# Patient Record
Sex: FEMALE | Race: WHITE | Employment: FULL TIME | URBAN - METROPOLITAN AREA
[De-identification: names, ages, dates, MRNs, and addresses within clinical notes are randomized per-mention and may not be internally consistent; named-entity substitution may affect disease eponyms.]

---

## 2021-06-04 ENCOUNTER — APPOINTMENT (OUTPATIENT)
Dept: GENERAL RADIOLOGY | Facility: CLINIC | Age: 36
End: 2021-06-04
Attending: EMERGENCY MEDICINE
Payer: COMMERCIAL

## 2021-06-04 ENCOUNTER — HOSPITAL ENCOUNTER (EMERGENCY)
Facility: CLINIC | Age: 36
Discharge: HOME | End: 2021-06-05
Attending: EMERGENCY MEDICINE | Admitting: EMERGENCY MEDICINE
Payer: COMMERCIAL

## 2021-06-04 VITALS
SYSTOLIC BLOOD PRESSURE: 135 MMHG | WEIGHT: 293 LBS | BODY MASS INDEX: 51.91 KG/M2 | DIASTOLIC BLOOD PRESSURE: 102 MMHG | RESPIRATION RATE: 16 BRPM | OXYGEN SATURATION: 96 % | HEART RATE: 109 BPM | HEIGHT: 63 IN | TEMPERATURE: 97.7 F

## 2021-06-04 DIAGNOSIS — S62.337A CLOSED DISPLACED FRACTURE OF NECK OF FIFTH METACARPAL BONE OF LEFT HAND, INITIAL ENCOUNTER: ICD-10-CM

## 2021-06-04 DIAGNOSIS — S30.1XXA CONTUSION OF ABDOMINAL WALL, INITIAL ENCOUNTER: ICD-10-CM

## 2021-06-04 PROCEDURE — 26600 TREAT METACARPAL FRACTURE: CPT | Mod: F4

## 2021-06-04 PROCEDURE — 250N000013 HC RX MED GY IP 250 OP 250 PS 637: Performed by: EMERGENCY MEDICINE

## 2021-06-04 PROCEDURE — 73130 X-RAY EXAM OF HAND: CPT | Mod: LT

## 2021-06-04 PROCEDURE — 99284 EMERGENCY DEPT VISIT MOD MDM: CPT | Mod: 25

## 2021-06-04 RX ORDER — IBUPROFEN 600 MG/1
600 TABLET, FILM COATED ORAL ONCE
Status: COMPLETED | OUTPATIENT
Start: 2021-06-04 | End: 2021-06-04

## 2021-06-04 RX ADMIN — IBUPROFEN 600 MG: 600 TABLET, FILM COATED ORAL at 22:01

## 2021-06-04 ASSESSMENT — ENCOUNTER SYMPTOMS
HEADACHES: 0
VOMITING: 0
MYALGIAS: 1
NAUSEA: 0
ABDOMINAL PAIN: 1
NECK PAIN: 0
BACK PAIN: 0

## 2021-06-04 ASSESSMENT — MIFFLIN-ST. JEOR: SCORE: 2209.97

## 2021-06-05 NOTE — DISCHARGE INSTRUCTIONS
Please ice and elevate the left and the right hand.  We are suspicion of abdominal wall bruising.  We do not feel you need imaging of the abdomen.  You did have a small fracture of the left distal fifth metatarsal but this is involving the joint space between the phalanx and the fifth metacarpal.  Please follow-up with orthopedics for reassessment.  Use ice and elevation okay to use ibuprofen or Tylenol for pain.  Safe travels back to [place] when you go.
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
## 2021-06-27 ENCOUNTER — HEALTH MAINTENANCE LETTER (OUTPATIENT)
Age: 36
End: 2021-06-27

## 2021-10-17 ENCOUNTER — HEALTH MAINTENANCE LETTER (OUTPATIENT)
Age: 36
End: 2021-10-17

## 2022-07-24 ENCOUNTER — HEALTH MAINTENANCE LETTER (OUTPATIENT)
Age: 37
End: 2022-07-24

## 2022-10-03 ENCOUNTER — HEALTH MAINTENANCE LETTER (OUTPATIENT)
Age: 37
End: 2022-10-03

## 2023-08-12 ENCOUNTER — HEALTH MAINTENANCE LETTER (OUTPATIENT)
Age: 38
End: 2023-08-12

## 2024-03-09 ENCOUNTER — HEALTH MAINTENANCE LETTER (OUTPATIENT)
Age: 39
End: 2024-03-09